# Patient Record
Sex: MALE | URBAN - METROPOLITAN AREA
[De-identification: names, ages, dates, MRNs, and addresses within clinical notes are randomized per-mention and may not be internally consistent; named-entity substitution may affect disease eponyms.]

---

## 2019-05-23 ENCOUNTER — OFFICE VISIT (OUTPATIENT)
Dept: URBAN - METROPOLITAN AREA CLINIC 76 | Facility: CLINIC | Age: 54
End: 2019-05-23
Payer: COMMERCIAL

## 2019-05-23 DIAGNOSIS — H52.4 PRESBYOPIA: Primary | ICD-10-CM

## 2019-05-23 DIAGNOSIS — Z96.1 PRESENCE OF INTRAOCULAR LENS: ICD-10-CM

## 2019-05-23 DIAGNOSIS — E11.65 TYPE 2 DIABETES MELLITUS WITH HYPERGLYCEMIA: ICD-10-CM

## 2019-05-23 PROCEDURE — 92015 DETERMINE REFRACTIVE STATE: CPT | Performed by: OPTOMETRIST

## 2019-05-23 PROCEDURE — 92014 COMPRE OPH EXAM EST PT 1/>: CPT | Performed by: OPTOMETRIST

## 2019-05-23 ASSESSMENT — KERATOMETRY
OD: 43.75
OS: 42.63

## 2019-05-23 ASSESSMENT — VISUAL ACUITY
OS: 20/400
OD: 20/25-

## 2019-05-23 ASSESSMENT — INTRAOCULAR PRESSURE
OS: 13
OD: 15

## 2019-05-23 NOTE — IMPRESSION/PLAN
Impression: Presence of intraocular lens: Z96.1. OU. OS. OU. Plan: Observe condition with no treatment required.

## 2019-05-23 NOTE — IMPRESSION/PLAN
Impression: Type 2 diabetes mellitus with hyperglycemia: E11.65. Plan: Pt states no longer diabetic diet controlled. Pt is being monitored by PCP. No treatment at this time.  Will continue to monitor

## 2023-10-06 NOTE — IMPRESSION/PLAN
Impression: Presbyopia: H52.4. Plan: A glasses prescription has been discussed and generated. Patient to call with any concerns. OS is balance lens. [employer] Counseling:  I discussed with the patient the risks of mycophenolate mofetil including but not limited to infection/immunosuppression, GI upset, hypokalemia, hypercholesterolemia, bone marrow suppression, lymphoproliferative disorders, malignancy, GI ulceration/bleed/perforation, colitis, interstitial lung disease, kidney failure, progressive multifocal leukoencephalopathy, and birth defects.  The patient understands that monitoring is required including a baseline creatinine and regular CBC testing. In addition, patient must alert us immediately if symptoms of infection or other concerning signs are noted.